# Patient Record
Sex: FEMALE | Race: BLACK OR AFRICAN AMERICAN | NOT HISPANIC OR LATINO | Employment: FULL TIME | URBAN - METROPOLITAN AREA
[De-identification: names, ages, dates, MRNs, and addresses within clinical notes are randomized per-mention and may not be internally consistent; named-entity substitution may affect disease eponyms.]

---

## 2023-02-22 LAB
CHOLESTEROL (MG/DL) IN SER/PLAS: 217 MG/DL (ref 0–199)
CHOLESTEROL IN HDL (MG/DL) IN SER/PLAS: 64.2 MG/DL
CHOLESTEROL/HDL RATIO: 3.4
LDL: 141 MG/DL (ref 0–99)
TRIGLYCERIDE (MG/DL) IN SER/PLAS: 57 MG/DL (ref 0–149)
VLDL: 11 MG/DL (ref 0–40)

## 2023-09-17 PROBLEM — G89.29 CHRONIC PAIN OF RIGHT KNEE: Status: ACTIVE | Noted: 2023-09-17

## 2023-09-17 PROBLEM — S99.911A RIGHT ANKLE INJURY, INITIAL ENCOUNTER: Status: ACTIVE | Noted: 2023-09-17

## 2023-09-17 PROBLEM — Z04.9 CONDITION NOT FOUND: Status: ACTIVE | Noted: 2023-09-17

## 2023-09-17 PROBLEM — M25.561 CHRONIC PAIN OF RIGHT KNEE: Status: ACTIVE | Noted: 2023-09-17

## 2023-09-17 PROBLEM — L93.1 SUBACUTE CUTANEOUS LUPUS ERYTHEMATOSUS: Status: ACTIVE | Noted: 2023-09-17

## 2023-09-17 PROBLEM — I10 BENIGN ESSENTIAL HYPERTENSION: Status: ACTIVE | Noted: 2023-09-17

## 2023-09-17 PROBLEM — R73.09 ELEVATED GLUCOSE: Status: ACTIVE | Noted: 2023-09-17

## 2023-09-17 PROBLEM — A74.9 CHLAMYDIA INFECTION: Status: ACTIVE | Noted: 2023-09-17

## 2023-09-17 PROBLEM — L93.0 DISCOID LUPUS ERYTHEMATOSUS: Status: ACTIVE | Noted: 2023-06-14

## 2023-09-17 PROBLEM — Z20.822 CONTACT WITH AND (SUSPECTED) EXPOSURE TO COVID-19: Status: ACTIVE | Noted: 2023-09-17

## 2023-09-17 PROBLEM — L21.9 SEBORRHEIC DERMATITIS OF SCALP: Status: ACTIVE | Noted: 2023-09-17

## 2023-09-17 PROBLEM — D48.5 NEOPLASM OF UNCERTAIN BEHAVIOR OF SKIN: Status: ACTIVE | Noted: 2023-06-14

## 2023-09-17 PROBLEM — L08.9 INFECTION OF SCALP: Status: ACTIVE | Noted: 2023-09-17

## 2023-09-17 PROBLEM — R76.8 POSITIVE ANA (ANTINUCLEAR ANTIBODY): Status: ACTIVE | Noted: 2023-09-17

## 2023-09-17 PROBLEM — R21 RASH AND OTHER NONSPECIFIC SKIN ERUPTION: Status: ACTIVE | Noted: 2023-06-14

## 2023-09-17 RX ORDER — CEPHALEXIN 500 MG/1
1 CAPSULE ORAL
COMMUNITY
Start: 2022-06-15 | End: 2024-04-30 | Stop reason: ALTCHOICE

## 2023-09-17 RX ORDER — BETAMETHASONE DIPROPIONATE 0.5 MG/G
1 LOTION TOPICAL
COMMUNITY
Start: 2022-10-26

## 2023-09-17 RX ORDER — AMLODIPINE BESYLATE 5 MG/1
1 TABLET ORAL DAILY
COMMUNITY
Start: 2023-02-02 | End: 2024-04-30 | Stop reason: SDDI

## 2023-09-17 RX ORDER — FLUOCINONIDE TOPICAL SOLUTION USP, 0.05% 0.5 MG/ML
SOLUTION TOPICAL 2 TIMES DAILY
COMMUNITY
Start: 2021-10-18

## 2023-10-18 ENCOUNTER — OFFICE VISIT (OUTPATIENT)
Dept: RHEUMATOLOGY | Facility: CLINIC | Age: 48
End: 2023-10-18
Payer: COMMERCIAL

## 2023-10-18 VITALS
SYSTOLIC BLOOD PRESSURE: 171 MMHG | DIASTOLIC BLOOD PRESSURE: 94 MMHG | HEIGHT: 65 IN | WEIGHT: 141 LBS | HEART RATE: 68 BPM | BODY MASS INDEX: 23.49 KG/M2

## 2023-10-18 DIAGNOSIS — R76.8 POSITIVE ANA (ANTINUCLEAR ANTIBODY): ICD-10-CM

## 2023-10-18 DIAGNOSIS — L93.1 SUBACUTE CUTANEOUS LUPUS ERYTHEMATOSUS: Primary | ICD-10-CM

## 2023-10-18 PROCEDURE — 3077F SYST BP >= 140 MM HG: CPT | Performed by: INTERNAL MEDICINE

## 2023-10-18 PROCEDURE — 1036F TOBACCO NON-USER: CPT | Performed by: INTERNAL MEDICINE

## 2023-10-18 PROCEDURE — 3080F DIAST BP >= 90 MM HG: CPT | Performed by: INTERNAL MEDICINE

## 2023-10-18 PROCEDURE — 99214 OFFICE O/P EST MOD 30 MIN: CPT | Performed by: INTERNAL MEDICINE

## 2023-10-18 NOTE — PROGRESS NOTES
"Follow-up Rheumatology Patient Visit    Chief Complaint:  Yessi Corcoran is a 48 y.o. female presenting today for Follow-up.    History of Presenting Problem:   47 y/o female with Hx of scalp lesion x 1 year present for evaluation. she seen dermatology 2 months ago and had a biopsy which showed interface dermatitis. she has Blood work which showed positive ILANA and DSDNA. She has no other current complaints.  Today she reports her scalp lesion is improving with topical lesions she denies any other systemic symptoms of lupus.  She is noted to have elevated blood pressure, she reports she forgets to take her blood pressure medication      Problem List:   Patient Active Problem List   Diagnosis    Benign essential hypertension    Chlamydia infection    Chronic pain of right knee    Contact with and (suspected) exposure to covid-19    Elevated glucose    Neoplasm of uncertain behavior of skin    Positive ILANA (antinuclear antibody)    Rash and other nonspecific skin eruption    Right ankle injury, initial encounter    Infection of scalp    Seborrheic dermatitis of scalp    Discoid lupus erythematosus    Subacute cutaneous lupus erythematosus    Condition not found    BMI 23.0-23.9, adult       Past Medical History: No past medical history on file.    Surgical History: No past surgical history on file.     Allergies: No Known Allergies    Medications:   Current Outpatient Medications:     betamethasone dipropionate (Diprosone) 0.05 % lotion, 1 Application., Disp: , Rfl:     amLODIPine (Norvasc) 5 mg tablet, Take 1 tablet (5 mg) by mouth once daily., Disp: , Rfl:     cephalexin (Keflex) 500 mg capsule, Take 1 capsule (500 mg) by mouth 2 times a day., Disp: , Rfl:     fluocinonide (Lidex) 0.05 % external solution, Apply topically twice a day., Disp: , Rfl:       Objective   Physical Examination:    Visit Vitals  BP (!) 171/94   Pulse 68   Ht 1.651 m (5' 5\")   Wt 64 kg (141 lb)   BMI 23.46 kg/m²   Smoking Status Never   BSA " 1.71 m²        Gen: NAD, A&O x 3  HEENT: clear sclera and conjunctiva,     Musculoskeletal:   Neck; WNL, full ROM  Shoulder: WNL, full ROM  Elbow:WNL, full ROM, no effusion noted  Wrist and fingers;no active synovitis noted, Full ROM in the Wrist , Good fist and   Knees:  No effusions or crepitation, full ROM.  Hips; WNL, full ROM, Negative Chandra test  Ankle, Feet; WNL, full ROM    Skin: No rashes or lesions seen, no nail changes  Neuro: A&O x3, Normal Gait    Procedures :None        Provider Impression:   Assessment/Plan   Encounter Diagnoses   Name Primary?    Subacute cutaneous lupus erythematosus Yes    Positive ILANA (antinuclear antibody)        47 y/o female with Hx of scalp lesion x 1 year present for evaluation. Lesion is consistent with interface dermatitis, she is noted to have positive ILANA and double-stranded DNA, patient likely has subacute cutaneous lupus, she does not have any systemic manifestation at this time  -Continue with dermatology management as they are working with topical treatments for now plan is to follow along then we can decide if patient will benefit from systemic treatment  -Check routine labs  -Follow-up in 6 to 8 months or sooner if any issues

## 2023-11-13 ENCOUNTER — LAB (OUTPATIENT)
Dept: LAB | Facility: LAB | Age: 48
End: 2023-11-13
Payer: COMMERCIAL

## 2023-11-13 DIAGNOSIS — L93.1 SUBACUTE CUTANEOUS LUPUS ERYTHEMATOSUS: ICD-10-CM

## 2023-11-13 LAB
ERYTHROCYTE [DISTWIDTH] IN BLOOD BY AUTOMATED COUNT: 12.9 % (ref 11.5–14.5)
HCT VFR BLD AUTO: 39.7 % (ref 36–46)
HGB BLD-MCNC: 12.6 G/DL (ref 12–16)
MCH RBC QN AUTO: 26.7 PG (ref 26–34)
MCHC RBC AUTO-ENTMCNC: 31.7 G/DL (ref 32–36)
MCV RBC AUTO: 84 FL (ref 80–100)
NRBC BLD-RTO: 0 /100 WBCS (ref 0–0)
PLATELET # BLD AUTO: 294 X10*3/UL (ref 150–450)
RBC # BLD AUTO: 4.72 X10*6/UL (ref 4–5.2)
WBC # BLD AUTO: 6.5 X10*3/UL (ref 4.4–11.3)

## 2023-11-13 PROCEDURE — 86160 COMPLEMENT ANTIGEN: CPT

## 2023-11-13 PROCEDURE — 86038 ANTINUCLEAR ANTIBODIES: CPT

## 2023-11-13 PROCEDURE — 85027 COMPLETE CBC AUTOMATED: CPT

## 2023-11-13 PROCEDURE — 81001 URINALYSIS AUTO W/SCOPE: CPT

## 2023-11-13 PROCEDURE — 86235 NUCLEAR ANTIGEN ANTIBODY: CPT

## 2023-11-13 PROCEDURE — 86225 DNA ANTIBODY NATIVE: CPT

## 2023-11-13 PROCEDURE — 36415 COLL VENOUS BLD VENIPUNCTURE: CPT

## 2023-11-13 PROCEDURE — 84156 ASSAY OF PROTEIN URINE: CPT

## 2023-11-13 PROCEDURE — 82306 VITAMIN D 25 HYDROXY: CPT

## 2023-11-13 PROCEDURE — 82570 ASSAY OF URINE CREATININE: CPT

## 2023-11-14 LAB
25(OH)D3 SERPL-MCNC: 18 NG/ML (ref 30–100)
ANA PATTERN: ABNORMAL
ANA SER QL HEP2 SUBST: POSITIVE
ANA TITR SER IF: ABNORMAL {TITER}
APPEARANCE UR: ABNORMAL
BILIRUB UR STRIP.AUTO-MCNC: NEGATIVE MG/DL
C3 SERPL-MCNC: 129 MG/DL (ref 87–200)
C4 SERPL-MCNC: 33 MG/DL (ref 10–50)
CENTROMERE B AB SER-ACNC: <0.2 AI
CHROMATIN AB SERPL-ACNC: 1 AI
COLOR UR: YELLOW
CREAT UR-MCNC: 245.8 MG/DL (ref 20–320)
DSDNA AB SER-ACNC: 20 IU/ML
ENA JO1 AB SER QL IA: <0.2 AI
ENA RNP AB SER IA-ACNC: 0.2 AI
ENA SCL70 AB SER QL IA: <0.2 AI
ENA SM AB SER IA-ACNC: 0.5 AI
ENA SM+RNP AB SER QL IA: 0.5 AI
ENA SS-A AB SER IA-ACNC: 0.3 AI
ENA SS-B AB SER IA-ACNC: <0.2 AI
GLUCOSE UR STRIP.AUTO-MCNC: NEGATIVE MG/DL
KETONES UR STRIP.AUTO-MCNC: NEGATIVE MG/DL
LEUKOCYTE ESTERASE UR QL STRIP.AUTO: ABNORMAL
MUCOUS THREADS #/AREA URNS AUTO: ABNORMAL /LPF
NITRITE UR QL STRIP.AUTO: NEGATIVE
PH UR STRIP.AUTO: 5 [PH]
PROT UR STRIP.AUTO-MCNC: ABNORMAL MG/DL
PROT UR-ACNC: 18 MG/DL (ref 5–24)
PROT/CREAT UR: 0.07 MG/MG CREAT (ref 0–0.17)
RBC # UR STRIP.AUTO: NEGATIVE /UL
RBC #/AREA URNS AUTO: ABNORMAL /HPF
RIBOSOMAL P AB SER-ACNC: <0.2 AI
SP GR UR STRIP.AUTO: 1.02
SQUAMOUS #/AREA URNS AUTO: ABNORMAL /HPF
UROBILINOGEN UR STRIP.AUTO-MCNC: <2 MG/DL
WBC #/AREA URNS AUTO: ABNORMAL /HPF

## 2023-11-16 ENCOUNTER — TELEPHONE (OUTPATIENT)
Dept: RHEUMATOLOGY | Facility: CLINIC | Age: 48
End: 2023-11-16
Payer: COMMERCIAL

## 2023-11-16 DIAGNOSIS — E55.9 VITAMIN D DEFICIENCY: Primary | ICD-10-CM

## 2023-11-16 RX ORDER — ERGOCALCIFEROL 1.25 MG/1
50000 CAPSULE ORAL
Qty: 12 CAPSULE | Refills: 0 | Status: SHIPPED | OUTPATIENT
Start: 2023-11-16 | End: 2024-02-08

## 2023-11-16 NOTE — TELEPHONE ENCOUNTER
No answer left detailed message on patient voicemail.  ----- Message from Megan Lagunas DO sent at 11/15/2023 12:43 PM EST -----  Please let patient know that  vitamin D is low, recommend they take high-dose vitamin D to improve their levels which will help immune system, bones and joints  Send 50,000 units weekly  Q: 12 R:0  When they finish high dose vitamin D to start taking 3000 units daily OTC

## 2023-12-13 ENCOUNTER — OFFICE VISIT (OUTPATIENT)
Dept: DERMATOLOGY | Facility: CLINIC | Age: 48
End: 2023-12-13
Payer: COMMERCIAL

## 2023-12-13 DIAGNOSIS — L93.0 DISCOID LUPUS ERYTHEMATOSUS: Primary | ICD-10-CM

## 2023-12-13 DIAGNOSIS — Z12.83 SCREENING EXAM FOR SKIN CANCER: ICD-10-CM

## 2023-12-13 DIAGNOSIS — D23.9 DERMATOFIBROMA: ICD-10-CM

## 2023-12-13 PROCEDURE — 1036F TOBACCO NON-USER: CPT | Performed by: DERMATOLOGY

## 2023-12-13 PROCEDURE — 99213 OFFICE O/P EST LOW 20 MIN: CPT | Performed by: DERMATOLOGY

## 2023-12-13 RX ORDER — BETAMETHASONE DIPROPIONATE 0.5 MG/G
LOTION TOPICAL 2 TIMES DAILY
Qty: 15 G | Refills: 0 | Status: SHIPPED | OUTPATIENT
Start: 2023-12-13

## 2023-12-13 ASSESSMENT — DERMATOLOGY QUALITY OF LIFE (QOL) ASSESSMENT
ARE THERE EXCLUSIONS OR EXCEPTIONS FOR THE QUALITY OF LIFE ASSESSMENT: NO
RATE HOW BOTHERED YOU ARE BY EFFECTS OF YOUR SKIN PROBLEMS ON YOUR ACTIVITIES (EG, GOING OUT, ACCOMPLISHING WHAT YOU WANT, WORK ACTIVITIES OR YOUR RELATIONSHIPS WITH OTHERS): 0 - NEVER BOTHERED
RATE HOW BOTHERED YOU ARE BY SYMPTOMS OF YOUR SKIN PROBLEM (EG, ITCHING, STINGING BURNING, HURTING OR SKIN IRRITATION): 0 - NEVER BOTHERED
RATE HOW EMOTIONALLY BOTHERED YOU ARE BY YOUR SKIN PROBLEM (FOR EXAMPLE, WORRY, EMBARRASSMENT, FRUSTRATION): 0 - NEVER BOTHERED
DATE THE QUALITY-OF-LIFE ASSESSMENT WAS COMPLETED: 66821

## 2023-12-13 ASSESSMENT — ITCH NUMERIC RATING SCALE: HOW SEVERE IS YOUR ITCHING?: 0

## 2023-12-13 ASSESSMENT — PATIENT GLOBAL ASSESSMENT (PGA): PATIENT GLOBAL ASSESSMENT: PATIENT GLOBAL ASSESSMENT:  1 - CLEAR

## 2023-12-13 ASSESSMENT — DERMATOLOGY PATIENT ASSESSMENT
HAVE YOU HAD OR DO YOU HAVE VASCULAR DISEASE: NO
ARE YOU AN ORGAN TRANSPLANT RECIPIENT: NO
DO YOU USE SUNSCREEN: OCCASIONALLY
HAVE YOU HAD OR DO YOU HAVE A STAPH INFECTION: NO
ARE YOU ON BIRTH CONTROL: NO
DO YOU HAVE ANY NEW OR CHANGING LESIONS: NO
DO YOU USE A TANNING BED: NO
ARE YOU TRYING TO GET PREGNANT: NO
DO YOU HAVE IRREGULAR MENSTRUAL CYCLES: NO

## 2023-12-13 ASSESSMENT — PHYSICIAN GLOBAL ASSESSMENT (PGA)
WHAT IS THE PGA: PHYSICIAN GLOBAL ASSESSMENT:  0 - CLEAR
WHAT IS THE PGA: PHYSICIAN GLOBAL ASSESSMENT:  1 - MINIMAL

## 2023-12-13 ASSESSMENT — BODY SURFACE AREA (BSA): WHAT IS THE BSA PERCENTAGE: < 3% BODY SURFACE AREA INVOLVED

## 2023-12-13 NOTE — PROGRESS NOTES
Subjective     Yessi Corcoran is a 48 y.o. female who presents for the following: Skin Check.  Last seen 6/23. Biopsy proven discoid lupus. Patient is using betamethasone 0.05% lotion occasionally. Patient saw rheumatology 10/23. No evidence of systemic manifestations at this time.     Discoid lupus - Bx 10/06/22, scalp - Screening labs +ILANA, high titer 1:1280. dsDNA 18     - Review of Systems:  No other skin or systemic complaints other than what is documented elsewhere in the note.    The following portions of the chart were reviewed this encounter and updated as appropriate:  Tobacco  Allergies  Meds  Problems  Med Hx  Surg Hx         Skin Cancer History  No skin cancer on file.      Specialty Problems          Dermatology Problems    Discoid lupus erythematosus    Seborrheic dermatitis of scalp        Objective   Well appearing patient in no apparent distress; mood and affect are within normal limits.    A full examination was performed including scalp, head, eyes, ears, nose, lips, neck, chest, axillae, abdomen, back, buttocks, bilateral upper extremities, bilateral lower extremities, hands, feet, fingers, toes, fingernails, and toenails. All findings within normal limits unless otherwise noted below.    Assessment/Plan   1. Discoid lupus erythematosus  Right Ear  brown plaque on antihelix of right ear.     -Asymptomatic lesion on right antihelix  - Patient instructed to apply betamethasone 0.05% lotion to the lesion BID.  - Patient educated as to the importance of frequent sunscreen use.   - No evidence of systemic manifestations, should patient notice darkening of urine, instructed to call provider.     Related Procedures  Follow Up In Dermatology - Established Patient    Related Medications  betamethasone dipropionate (Diprosone) 0.05 % lotion  Apply topically 2 times a day.    2. Dermatofibroma (4)  Left Lower Leg - Anterior, Left Thigh - Anterior, Right Lower Leg - Anterior, Right Thigh -  Anterior  Brown firm papules and nodules on lower extremities    Reassured patient as to the benign nature of the conditions     3. Screening exam for skin cancer    Full body skin exam  -No lesions concerning for malignancy on the remainder the skin exam today   - The ugly duckling sign was discussed. Monitor for any skin lesions that are different in color, shape, or size than others on body  -Sun protection was discussed. Recommend SPF 30+, hats with brims, sun protective clothing, and avoiding sun exposure between 10 AM and 2 PM whenever possible  -Recommend regular skin exams every 2-3 years or sooner if new or changing lesions         Ermias Lofton MD     I saw and evaluated the patient. I personally obtained the key and critical portions of the history and physical exam or was physically present for key and critical portions performed by the resident/fellow. I reviewed the resident/fellow's documentation and discussed the patient with the resident/fellow. I agree with the resident/fellow's medical decision making as documented in the note and made changes where appropriate.     Lizett Briseno MD     Follow up 6 months to keep close eye on lupus

## 2023-12-14 PROBLEM — D48.5 NEOPLASM OF UNCERTAIN BEHAVIOR OF SKIN: Status: RESOLVED | Noted: 2023-06-14 | Resolved: 2023-12-14

## 2023-12-14 PROBLEM — R21 RASH AND OTHER NONSPECIFIC SKIN ERUPTION: Status: RESOLVED | Noted: 2023-06-14 | Resolved: 2023-12-14

## 2024-01-02 DIAGNOSIS — L93.0 DISCOID LUPUS ERYTHEMATOSUS: Primary | ICD-10-CM

## 2024-01-02 RX ORDER — MOMETASONE FUROATE 1 MG/ML
SOLUTION TOPICAL DAILY
Qty: 60 ML | Refills: 3 | Status: SHIPPED | OUTPATIENT
Start: 2024-01-02

## 2024-01-02 NOTE — PROGRESS NOTES
Insurance would not cover betamethasone lotion. On denial letter they said they would cover mometasone.     Alternative of mometasone lotion sent to pharmacy. Please make patient aware

## 2024-04-30 ENCOUNTER — OFFICE VISIT (OUTPATIENT)
Dept: RHEUMATOLOGY | Facility: CLINIC | Age: 49
End: 2024-04-30
Payer: COMMERCIAL

## 2024-04-30 ENCOUNTER — OFFICE VISIT (OUTPATIENT)
Dept: PRIMARY CARE | Facility: CLINIC | Age: 49
End: 2024-04-30
Payer: COMMERCIAL

## 2024-04-30 VITALS
HEIGHT: 65 IN | HEART RATE: 72 BPM | WEIGHT: 144.2 LBS | TEMPERATURE: 98.2 F | SYSTOLIC BLOOD PRESSURE: 160 MMHG | OXYGEN SATURATION: 98 % | BODY MASS INDEX: 24.03 KG/M2 | DIASTOLIC BLOOD PRESSURE: 90 MMHG

## 2024-04-30 VITALS
HEART RATE: 58 BPM | SYSTOLIC BLOOD PRESSURE: 205 MMHG | BODY MASS INDEX: 23.91 KG/M2 | DIASTOLIC BLOOD PRESSURE: 110 MMHG | HEIGHT: 65 IN | WEIGHT: 143.5 LBS

## 2024-04-30 DIAGNOSIS — R03.0 ELEVATED BLOOD PRESSURE READING: ICD-10-CM

## 2024-04-30 DIAGNOSIS — Z00.00 ROUTINE GENERAL MEDICAL EXAMINATION AT A HEALTH CARE FACILITY: Primary | ICD-10-CM

## 2024-04-30 DIAGNOSIS — R76.8 POSITIVE ANA (ANTINUCLEAR ANTIBODY): ICD-10-CM

## 2024-04-30 DIAGNOSIS — L93.1 SUBACUTE CUTANEOUS LUPUS ERYTHEMATOSUS: Primary | ICD-10-CM

## 2024-04-30 DIAGNOSIS — I10 BENIGN ESSENTIAL HYPERTENSION: ICD-10-CM

## 2024-04-30 DIAGNOSIS — R73.09 ELEVATED GLUCOSE: ICD-10-CM

## 2024-04-30 PROCEDURE — 99396 PREV VISIT EST AGE 40-64: CPT | Performed by: FAMILY MEDICINE

## 2024-04-30 PROCEDURE — 3078F DIAST BP <80 MM HG: CPT | Performed by: FAMILY MEDICINE

## 2024-04-30 PROCEDURE — 3075F SYST BP GE 130 - 139MM HG: CPT | Performed by: FAMILY MEDICINE

## 2024-04-30 PROCEDURE — 3008F BODY MASS INDEX DOCD: CPT | Performed by: FAMILY MEDICINE

## 2024-04-30 PROCEDURE — 99214 OFFICE O/P EST MOD 30 MIN: CPT | Performed by: INTERNAL MEDICINE

## 2024-04-30 PROCEDURE — 3080F DIAST BP >= 90 MM HG: CPT | Performed by: INTERNAL MEDICINE

## 2024-04-30 PROCEDURE — 1036F TOBACCO NON-USER: CPT | Performed by: FAMILY MEDICINE

## 2024-04-30 PROCEDURE — 3077F SYST BP >= 140 MM HG: CPT | Performed by: INTERNAL MEDICINE

## 2024-04-30 ASSESSMENT — ENCOUNTER SYMPTOMS
LOSS OF SENSATION IN FEET: 0
DEPRESSION: 0
OCCASIONAL FEELINGS OF UNSTEADINESS: 0

## 2024-04-30 ASSESSMENT — ANXIETY QUESTIONNAIRES
GAD7 TOTAL SCORE: 0
4. TROUBLE RELAXING: NOT AT ALL
3. WORRYING TOO MUCH ABOUT DIFFERENT THINGS: NOT AT ALL
2. NOT BEING ABLE TO STOP OR CONTROL WORRYING: NOT AT ALL
1. FEELING NERVOUS, ANXIOUS, OR ON EDGE: NOT AT ALL
6. BECOMING EASILY ANNOYED OR IRRITABLE: NOT AT ALL
7. FEELING AFRAID AS IF SOMETHING AWFUL MIGHT HAPPEN: NOT AT ALL
5. BEING SO RESTLESS THAT IT IS HARD TO SIT STILL: NOT AT ALL

## 2024-04-30 ASSESSMENT — PATIENT HEALTH QUESTIONNAIRE - PHQ9
SUM OF ALL RESPONSES TO PHQ9 QUESTIONS 1 AND 2: 0
1. LITTLE INTEREST OR PLEASURE IN DOING THINGS: NOT AT ALL
2. FEELING DOWN, DEPRESSED OR HOPELESS: NOT AT ALL

## 2024-04-30 NOTE — PROGRESS NOTES
"Follow-up Rheumatology Patient Visit    Chief Complaint:  Yessi Corcoran is a 49 y.o. female presenting today for Follow-up.    History of Presenting Problem:   48 y/o female with Hx of scalp lesion x 1 year present for evaluation. she seen dermatology 2 months ago and had a biopsy which showed interface dermatitis. she has Blood work which showed positive ILANA and DSDNA. She has no other current complaints.  Today she reports her scalp lesion is improving with topical lesions she denies any other systemic symptoms of lupus.    She forgot to take her blood pressure medicine today          Problem List:   Patient Active Problem List   Diagnosis    Benign essential hypertension    Chlamydia infection    Chronic pain of right knee    Contact with and (suspected) exposure to covid-19    Elevated glucose    Positive ILANA (antinuclear antibody)    Right ankle injury, initial encounter    Infection of scalp    Seborrheic dermatitis of scalp    Discoid lupus erythematosus    Subacute cutaneous lupus erythematosus    Condition not found    BMI 23.0-23.9, adult       Past Medical History: No past medical history on file.    Surgical History: No past surgical history on file.     Allergies: No Known Allergies    Medications:   Current Outpatient Medications:     fluocinonide (Lidex) 0.05 % external solution, Apply topically twice a day., Disp: , Rfl:     mometasone (Elocon) 0.1 % lotion, Apply topically once daily. To affected areas of lupus on ears or scalp for 4-6 weeks, then stop, Disp: 60 mL, Rfl: 3    betamethasone dipropionate (Diprosone) 0.05 % lotion, 1 Application., Disp: , Rfl:     betamethasone dipropionate (Diprosone) 0.05 % lotion, Apply topically 2 times a day., Disp: 15 g, Rfl: 0      Objective   Physical Examination:    Visit Vitals  BP (!) 205/110   Pulse 58   Ht 1.651 m (5' 5\")   Wt 65.1 kg (143 lb 8 oz)   BMI 23.88 kg/m²   Smoking Status Never   BSA 1.73 m²        Gen: NAD, A&O x 3  HEENT: clear sclera and " conjunctiva,     Musculoskeletal:   Neck; WNL, full ROM  Shoulder: WNL, full ROM  Elbow:WNL, full ROM, no effusion noted  Wrist and fingers;no active synovitis noted, Full ROM in the Wrist , Good fist and   Knees:  No effusions or crepitation, full ROM.  Hips; WNL, full ROM, Negative Chandra test  Ankle, Feet; WNL, full ROM    Skin: No rashes or lesions seen, no nail changes  Neuro: A&O x3, Normal Gait    Procedures :None        Provider Impression:   Assessment/Plan   Encounter Diagnoses   Name Primary?    Subacute cutaneous lupus erythematosus Yes    Positive ILANA (antinuclear antibody)     Elevated blood pressure reading          48 y/o female with Hx of scalp lesion x 1 year present for evaluation. Lesion is consistent with interface dermatitis, she is noted to have positive ILANA and double-stranded DNA, patient likely has subacute cutaneous lupus, she does not have any systemic manifestation at this time  -Continue with dermatology management as they are working with topical. treatments for now plan is to follow along then we can decide if patient will benefit from systemic treatment  -Check routine labs including urine test  -Encourage adherence to blood pressure management   -Follow-up in 6 to 8 months or sooner if any issues

## 2024-04-30 NOTE — PROGRESS NOTES
"Subjective   Patient ID: Yessi Corcoran is a 49 y.o. female who presents for Annual Exam.  Well Adult Physical   Patient here for a comprehensive physical exam     History:  LMP: now  Cycle still regular.  Last pap date: 2022  Abnormal pap? no  No menopausal symptoms     Right shoulder hurts with certain movements, or if lifts.  Is in the deltoid area  No neck pain.  Has been for about a month.  Right handed, does everything with right.  No numbness or tingling.  No meds needed for it.    Never started the amlodipine for BP.  Not checking BP at home.  BP was high at rheumatologist this AM,  but was not taken correctly (was over clothes, with feet crossed, etc).        Review of Systems   All other systems reviewed and are negative.      Objective   /90   Pulse 72   Temp 36.8 °C (98.2 °F)   Ht 1.651 m (5' 5\")   Wt 65.4 kg (144 lb 3.2 oz)   SpO2 98%   BMI 24.00 kg/m²     Physical Exam  Vitals reviewed.   Constitutional:       General: She is not in acute distress.     Appearance: Normal appearance.   HENT:      Head: Normocephalic and atraumatic.   Eyes:      Pupils: Pupils are equal, round, and reactive to light.   Cardiovascular:      Rate and Rhythm: Normal rate and regular rhythm.      Heart sounds: Normal heart sounds. No murmur heard.  Pulmonary:      Effort: Pulmonary effort is normal.      Breath sounds: Normal breath sounds.   Abdominal:      General: Bowel sounds are normal.      Palpations: Abdomen is soft. There is no mass.      Tenderness: There is no abdominal tenderness.   Musculoskeletal:      Right shoulder: Tenderness (deltoid) present. No swelling, deformity, bony tenderness or crepitus. Decreased range of motion (with internal rotation and reaching to back). Normal strength. Normal pulse.      Cervical back: No rigidity.   Lymphadenopathy:      Cervical: No cervical adenopathy.   Skin:     General: Skin is warm and dry.   Neurological:      Mental Status: She is alert. Mental status " is at baseline.      Gait: Gait normal.   Psychiatric:         Mood and Affect: Mood normal.         Behavior: Behavior normal.         Thought Content: Thought content normal.         Judgment: Judgment normal.           Assessment/Plan   Problem List Items Addressed This Visit       Benign essential hypertension    Elevated glucose     Other Visit Diagnoses       Encounter for routine child health examination w/o abnormal findings    -  Primary    Routine general medical examination at a health care facility        Relevant Orders    Hemoglobin A1C    Lipid Panel    Body mass index (BMI) of 24.0 to 24.9 in adult

## 2024-04-30 NOTE — PATIENT INSTRUCTIONS
Immunizations recommended:  --Flu shot every fall.  --Tetanus every 10 years. Yours was done in 2022.  --Covid vaccine.     If your pap smears have been normal, you can do them every 3-5 years, and stop after the age of 65.  Yours was done in 2022.     Colonoscopy should be done every 10 years after the age of 45, unless there was a previous abnormality, or family history of colon cancer.  Alternatives are FIT stool test for blood yearly, or cologuard every 3 years.  You can let me know which you would like to do.     Mammogram screening recommendations are changing, but at this time, I recommend a mammogram every 1-2 years in your 40's, and yearly after the age of 50. Having a family history of breast cancer may change that.  Yours was done in 2021, so ordered for this year.     You should try to get 150 minutes of exercise weekly.  Be sure to eat a diet rich in fruits and vegetables, and low in saturated fats and sodium.  Strive for a healthy BMI of less than 25.      Make sure to wear sun screen when outside, and check for changing or unusual moles.     Pre-menopause recommendations are for 1000 mg calcium and 1000 units vitamin D3 daily. After menopause calcium recommendation is 1200 mg, with 1000 units of vitamin D3     I recommend you get a Living Will and Durable Power of  for Healthcare. These documents state what care you would want if you couldn't speak for yourself. They help your loved ones in caring for you if that happens.   Once you have those forms completed, you can keep a copy on file with your doctor.     Specialists being seen:  Dermatologist--discoid lupus  Rheumatologist--positive ILANA     BP high today.  Monitor periodically if able.  Call  or email readings in a couple weeks.  If still running high, medication needs to be started.  Info on HTN given.  Blood Pressure Treatment goals include:   BP of 120/80, with no higher than 140/85 on consistent basis.   Exercise at 150 minutes  weekly.  Eat a balanced diet, rich in fruits and vegetables, low in sodium and processed foods.  If you are overweight, work toward a goal BMI of less than 25.  Consider coronary calcium score to evaluate heart disease risk.     Check fasting lipids and A1C with blood work ordered  by rheumatologist.    Right  shoulder pain.  If persists, consider PT vs ortho referral

## 2024-05-10 ENCOUNTER — LAB (OUTPATIENT)
Dept: LAB | Facility: LAB | Age: 49
End: 2024-05-10
Payer: COMMERCIAL

## 2024-05-10 DIAGNOSIS — L93.1 SUBACUTE CUTANEOUS LUPUS ERYTHEMATOSUS: ICD-10-CM

## 2024-05-10 DIAGNOSIS — Z00.00 ROUTINE GENERAL MEDICAL EXAMINATION AT A HEALTH CARE FACILITY: ICD-10-CM

## 2024-05-10 DIAGNOSIS — R76.8 POSITIVE ANA (ANTINUCLEAR ANTIBODY): ICD-10-CM

## 2024-05-10 LAB
25(OH)D3 SERPL-MCNC: 56 NG/ML (ref 30–100)
ALBUMIN SERPL BCP-MCNC: 4.5 G/DL (ref 3.4–5)
ALP SERPL-CCNC: 39 U/L (ref 33–110)
ALT SERPL W P-5'-P-CCNC: 23 U/L (ref 7–45)
ANION GAP SERPL CALC-SCNC: 14 MMOL/L (ref 10–20)
APPEARANCE UR: ABNORMAL
AST SERPL W P-5'-P-CCNC: 20 U/L (ref 9–39)
BASOPHILS # BLD AUTO: 0.04 X10*3/UL (ref 0–0.1)
BASOPHILS NFR BLD AUTO: 0.8 %
BILIRUB SERPL-MCNC: 0.5 MG/DL (ref 0–1.2)
BILIRUB UR STRIP.AUTO-MCNC: NEGATIVE MG/DL
BUN SERPL-MCNC: 14 MG/DL (ref 6–23)
C3 SERPL-MCNC: 111 MG/DL (ref 87–200)
C4 SERPL-MCNC: 27 MG/DL (ref 10–50)
CALCIUM SERPL-MCNC: 9.3 MG/DL (ref 8.6–10.6)
CHLORIDE SERPL-SCNC: 100 MMOL/L (ref 98–107)
CHOLEST SERPL-MCNC: 231 MG/DL (ref 0–199)
CHOLESTEROL/HDL RATIO: 3.5
CO2 SERPL-SCNC: 29 MMOL/L (ref 21–32)
COLOR UR: YELLOW
CREAT SERPL-MCNC: 0.85 MG/DL (ref 0.5–1.05)
CREAT UR-MCNC: 244.2 MG/DL (ref 20–320)
DSDNA AB SER-ACNC: 14 IU/ML
EGFRCR SERPLBLD CKD-EPI 2021: 85 ML/MIN/1.73M*2
EOSINOPHIL # BLD AUTO: 0.06 X10*3/UL (ref 0–0.7)
EOSINOPHIL NFR BLD AUTO: 1.3 %
ERYTHROCYTE [DISTWIDTH] IN BLOOD BY AUTOMATED COUNT: 13.2 % (ref 11.5–14.5)
EST. AVERAGE GLUCOSE BLD GHB EST-MCNC: 114 MG/DL
GLUCOSE SERPL-MCNC: 84 MG/DL (ref 74–99)
GLUCOSE UR STRIP.AUTO-MCNC: NORMAL MG/DL
HBA1C MFR BLD: 5.6 %
HCT VFR BLD AUTO: 38.5 % (ref 36–46)
HDLC SERPL-MCNC: 65.7 MG/DL
HGB BLD-MCNC: 12.2 G/DL (ref 12–16)
IMM GRANULOCYTES # BLD AUTO: 0.01 X10*3/UL (ref 0–0.7)
IMM GRANULOCYTES NFR BLD AUTO: 0.2 % (ref 0–0.9)
KETONES UR STRIP.AUTO-MCNC: NEGATIVE MG/DL
LDLC SERPL CALC-MCNC: 149 MG/DL
LEUKOCYTE ESTERASE UR QL STRIP.AUTO: ABNORMAL
LYMPHOCYTES # BLD AUTO: 2.36 X10*3/UL (ref 1.2–4.8)
LYMPHOCYTES NFR BLD AUTO: 49.8 %
MCH RBC QN AUTO: 26.5 PG (ref 26–34)
MCHC RBC AUTO-ENTMCNC: 31.7 G/DL (ref 32–36)
MCV RBC AUTO: 84 FL (ref 80–100)
MONOCYTES # BLD AUTO: 0.44 X10*3/UL (ref 0.1–1)
MONOCYTES NFR BLD AUTO: 9.3 %
MUCOUS THREADS #/AREA URNS AUTO: ABNORMAL /LPF
NEUTROPHILS # BLD AUTO: 1.83 X10*3/UL (ref 1.2–7.7)
NEUTROPHILS NFR BLD AUTO: 38.6 %
NITRITE UR QL STRIP.AUTO: NEGATIVE
NON HDL CHOLESTEROL: 165 MG/DL (ref 0–149)
NRBC BLD-RTO: 0 /100 WBCS (ref 0–0)
PH UR STRIP.AUTO: 6 [PH]
PLATELET # BLD AUTO: 292 X10*3/UL (ref 150–450)
POTASSIUM SERPL-SCNC: 4 MMOL/L (ref 3.5–5.3)
PROT SERPL-MCNC: 7.6 G/DL (ref 6.4–8.2)
PROT UR STRIP.AUTO-MCNC: ABNORMAL MG/DL
PROT UR-ACNC: 20 MG/DL (ref 5–24)
PROT/CREAT UR: 0.08 MG/MG CREAT (ref 0–0.17)
RBC # BLD AUTO: 4.6 X10*6/UL (ref 4–5.2)
RBC # UR STRIP.AUTO: NEGATIVE /UL
RBC #/AREA URNS AUTO: ABNORMAL /HPF
SODIUM SERPL-SCNC: 139 MMOL/L (ref 136–145)
SP GR UR STRIP.AUTO: 1.02
SQUAMOUS #/AREA URNS AUTO: ABNORMAL /HPF
TRIGL SERPL-MCNC: 81 MG/DL (ref 0–149)
UROBILINOGEN UR STRIP.AUTO-MCNC: NORMAL MG/DL
VLDL: 16 MG/DL (ref 0–40)
WBC # BLD AUTO: 4.7 X10*3/UL (ref 4.4–11.3)
WBC #/AREA URNS AUTO: ABNORMAL /HPF

## 2024-05-10 PROCEDURE — 80061 LIPID PANEL: CPT

## 2024-05-10 PROCEDURE — 82306 VITAMIN D 25 HYDROXY: CPT

## 2024-05-10 PROCEDURE — 85025 COMPLETE CBC W/AUTO DIFF WBC: CPT

## 2024-05-10 PROCEDURE — 36415 COLL VENOUS BLD VENIPUNCTURE: CPT

## 2024-05-10 PROCEDURE — 84156 ASSAY OF PROTEIN URINE: CPT

## 2024-05-10 PROCEDURE — 82570 ASSAY OF URINE CREATININE: CPT

## 2024-05-10 PROCEDURE — 81001 URINALYSIS AUTO W/SCOPE: CPT

## 2024-05-10 PROCEDURE — 86160 COMPLEMENT ANTIGEN: CPT

## 2024-05-10 PROCEDURE — 80053 COMPREHEN METABOLIC PANEL: CPT

## 2024-05-10 PROCEDURE — 86225 DNA ANTIBODY NATIVE: CPT

## 2024-05-10 PROCEDURE — 83036 HEMOGLOBIN GLYCOSYLATED A1C: CPT

## 2024-05-11 NOTE — RESULT ENCOUNTER NOTE
A1C is in the non-diabetic range.  Your cholesterol/lipids were higher than desired.  Work on reducing cholesterol and fat in your diet, increase fiber, and increased exercise.

## 2024-06-13 ENCOUNTER — APPOINTMENT (OUTPATIENT)
Dept: DERMATOLOGY | Facility: CLINIC | Age: 49
End: 2024-06-13
Payer: COMMERCIAL

## 2024-06-13 DIAGNOSIS — L93.0 DISCOID LUPUS ERYTHEMATOSUS: ICD-10-CM

## 2024-06-13 PROCEDURE — 1036F TOBACCO NON-USER: CPT | Performed by: DERMATOLOGY

## 2024-06-13 PROCEDURE — 3008F BODY MASS INDEX DOCD: CPT | Performed by: DERMATOLOGY

## 2024-06-13 PROCEDURE — G2211 COMPLEX E/M VISIT ADD ON: HCPCS | Performed by: DERMATOLOGY

## 2024-06-13 PROCEDURE — 99213 OFFICE O/P EST LOW 20 MIN: CPT | Performed by: DERMATOLOGY

## 2024-06-13 NOTE — PROGRESS NOTES
Subjective     Yessi Corcoran is a 49 y.o. female who presents for the following: discoid lupus erythematosus .     Last seen 12/2023.  Biopsy proven discoid lupus.   Patient is using betamethasone 0.05% lotion occasionally. Patient states due to insurance she was switched to fluocinonide solution and has been using as needed. Patient states that she believes she has improvement and no itch.     Patient saw rheumatology 04/2024 - no systemic manifestations at this time, cont to [facility] for [facility] screen[facility], she is scheduled to see them again 12/2024        Review of Systems:  No other skin or systemic complaints other than what is documented elsewhere in the note.    The following portions of the chart were reviewed this encounter and updated as appropriate:   Tobacco  Allergies  Meds  Problems  Med Hx  Surg Hx         Skin Cancer History  No skin cancer on file.      Specialty Problems          Dermatology Problems    Discoid lupus erythematosus     Discoid lupus - Bx 10/06/22, scalp - Screening labs +ILANA, high titer 1:1280. dsDNA 18          Seborrheic dermatitis of scalp        Objective   Well appearing patient in no apparent distress; mood and affect are within normal limits.    A focused skin examination was performed. All findings within normal limits unless otherwise noted below.    Assessment/Plan   1. Discoid lupus erythematosus  Scalp  Brown patches behind right ear. No plaques, no activity, no patches of alopecia    -Discussed nature of condition  - stable    Discoid lupus - Bx 10/06/22, scalp - Screening labs +ILANA, high titer 1:1280. dsDNA 18     -This condition is sometimes associated with systemic (internal) lupus, or may affect the skin only.  - cont to follow with rheum, no systemic symptoms - she has upcoming appt 12/2024    -she has fluocinonide solution and betamethasone lotion at home  - insurance will not cover betamethasone anymore, I had sent mometasone but she never  picked it up    - not currently using a topical steroid, knows to use for up to 2-3 weeks at as needed if she has itching  - she has big hat and sunscreen ready for summer and upcoming trip to [facility]    Related Procedures  Follow Up In Dermatology - Established Patient  Follow Up In Dermatology - Established Patient    Related Medications  betamethasone dipropionate (Diprosone) 0.05 % lotion  Apply topically 2 times a day.    mometasone (Elocon) 0.1 % lotion  Apply topically once daily. To affected areas of lupus on ears or scalp for 4-6 weeks, then stop      This is a serious and/or complex medical condition that requires long-term dermatologic care and management.      Follow up 1 year or sooner as needed

## 2024-12-23 ENCOUNTER — APPOINTMENT (OUTPATIENT)
Dept: RHEUMATOLOGY | Facility: CLINIC | Age: 49
End: 2024-12-23
Payer: COMMERCIAL